# Patient Record
Sex: MALE | Race: BLACK OR AFRICAN AMERICAN | URBAN - METROPOLITAN AREA
[De-identification: names, ages, dates, MRNs, and addresses within clinical notes are randomized per-mention and may not be internally consistent; named-entity substitution may affect disease eponyms.]

---

## 2022-03-17 ENCOUNTER — APPOINTMENT (OUTPATIENT)
Dept: GENERAL RADIOLOGY | Age: 27
End: 2022-03-17
Payer: MEDICARE

## 2022-03-17 ENCOUNTER — HOSPITAL ENCOUNTER (EMERGENCY)
Age: 27
Discharge: HOME | End: 2022-03-18
Attending: EMERGENCY MEDICINE
Payer: MEDICARE

## 2022-03-17 VITALS
SYSTOLIC BLOOD PRESSURE: 154 MMHG | HEART RATE: 104 BPM | RESPIRATION RATE: 16 BRPM | OXYGEN SATURATION: 97 % | DIASTOLIC BLOOD PRESSURE: 85 MMHG | TEMPERATURE: 98.4 F

## 2022-03-17 DIAGNOSIS — G89.29 CHRONIC THUMB PAIN, BILATERAL: ICD-10-CM

## 2022-03-17 DIAGNOSIS — Z00.8 MEDICAL CLEARANCE FOR INCARCERATION: Primary | ICD-10-CM

## 2022-03-17 DIAGNOSIS — M79.644 CHRONIC THUMB PAIN, BILATERAL: ICD-10-CM

## 2022-03-17 DIAGNOSIS — M79.645 CHRONIC THUMB PAIN, BILATERAL: ICD-10-CM

## 2022-03-17 PROCEDURE — 71045 X-RAY EXAM CHEST 1 VIEW: CPT

## 2022-03-17 PROCEDURE — 73130 X-RAY EXAM OF HAND: CPT

## 2022-03-17 PROCEDURE — 74018 RADEX ABDOMEN 1 VIEW: CPT

## 2022-03-17 PROCEDURE — 99283 EMERGENCY DEPT VISIT LOW MDM: CPT

## 2022-03-17 RX ORDER — ACETAMINOPHEN 500 MG
500 TABLET ORAL EVERY 6 HOURS PRN
Qty: 20 TABLET | Refills: 0 | Status: SHIPPED | OUTPATIENT
Start: 2022-03-17 | End: 2022-03-22

## 2022-03-17 ASSESSMENT — ENCOUNTER SYMPTOMS
SORE THROAT: 0
NAUSEA: 0
COUGH: 0
CHEST TIGHTNESS: 0
CONSTIPATION: 0
VOMITING: 0
BACK PAIN: 0
SHORTNESS OF BREATH: 0
DIARRHEA: 0
ABDOMINAL PAIN: 0
RHINORRHEA: 0

## 2022-03-18 NOTE — ED PROVIDER NOTES
[address] [facility]     Emergency Department     Faculty Note/ Attestation      Pt Name: Mayuri Turcios                                       MRN: [id number]  [place] 1995  Date of evaluation: 3/17/2022    Patients PCP:    No primary care provider on file. Attestation  I performed a history and physical examination of the patient and discussed management with the resident. I reviewed the residents note and agree with the documented findings and plan of care. Any areas of disagreement are noted on the chart. I was personally present for the key portions of any procedures. I have documented in the chart those procedures where I was not present during the key portions. I have reviewed the emergency nurses triage note. I agree with the chief complaint, past medical history, past surgical history, allergies, medications, social and family history as documented unless otherwise noted below. For Physician Assistant/ Nurse Practitioner cases/documentation I have personally evaluated this patient and have completed at least one if not all key elements of the E/M (history, physical exam, and MDM). Additional findings are as noted. Initial Screens:             Vitals: There were no vitals filed for this visit. CHIEF COMPLAINT     No chief complaint on file.             DIAGNOSTIC RESULTS             RADIOLOGY:   XR ABDOMEN (KUB) (SINGLE AP VIEW)    (Results Pending)   XR CHEST PORTABLE    (Results Pending)   XR HAND RIGHT (MIN 3 VIEWS)    (Results Pending)   XR HAND LEFT (MIN 3 VIEWS)    (Results Pending)         LABS:  Labs Reviewed - No data to display      EMERGENCY DEPARTMENT COURSE:     -------------------------   ,  ,  ,        Comments    Being booked at halfway, staff saw him swallow something  Pt denies swallowing anything   only complaints of chronic thumb pain    Patient again adamantly denies taking anything, he is asking for food, chest x-ray and abdominal x-ray performed, no foreign body identified. Please state they do have something on camera where he swallowed something however we have no evidence of that and the patient seems reliable.   Will discharge patient back in the police custody    (Please note that portions of this note were completed with a voice recognition program.  Efforts were made to edit the dictations but occasionally words are mis-transcribed.)      Josef Holliday MD,, MD  Attending Emergency Physician         Josef Holliday MD  03/17/22 1104

## 2022-03-18 NOTE — ED NOTES
Pt to ED via [employer]. Per [employer] they saw pt swallow something. Pt denies. Airway intact.  Pt is a/o, ambulatory, RR even and non labored      Valeriy Louis, RN  03/17/22 0898

## 2022-03-18 NOTE — ED PROVIDER NOTES
[facility]  ED  Emergency Department Encounter  Emergency Medicine Resident     Pt Name: Rickie Rogers  MRN: [id number]  [place] 1995  Date of evaluation: 3/17/22  PCP:  No primary care provider on file. CHIEF COMPLAINT       Chief Complaint   Patient presents with    Swallowed Foreign Body       HISTORY OFPRESENT ILLNESS  (Location/Symptom, Timing/Onset, Context/Setting, Quality, Duration, Modifying Factors,Severity.)      Rickie Rogers is a 31 y.o. male with no significant past medical history who presents in police custody after reportedly swallowing a foreign body. Per  at bedside, patient was taken into another room where he was asked to change into a prisoners jumpsuit when he took something out of his waistband and swallowed it. The officer bedside was not the one who witnessed to this so was unable to say what it looked like that he swallowed. Patient adamantly denies swallowing anything. He denies swallowing any sharp objects, batteries or drugs. He does report that he was tackled to the ground afterwards by police officers but denies any significant injuries. He did not hit his head or lose consciousness. He is not taking any blood thinners. Patient does report bilateral thumb pain which he states has been going on \"for a year\". States the pain is worse in his right thumb and that he cannot put any pressure on it. He states his thumb pain is worse on the right and also worsened by being tackled by the officers. Patient does not take any medications and denies any medical history. PAST MEDICAL / SURGICAL / SOCIAL / FAMILY HISTORY     Denies medical or surgical history    Social:  Patient arrives from detention    Family Hx: No family history on file. Allergies:  Patient has no allergy information on record. Home Medications:  Prior to Admission medications    Medication Sig Start Date End Date Taking?  Authorizing Provider   acetaminophen (TYLENOL) 500 MG tablet Take 1 tablet by mouth every 6 hours as needed for Pain 3/17/22 3/22/22 Yes Army Jensen, DO       REVIEW OFSYSTEMS    (2-9 systems for level 4, 10 or more for level 5)      Review of Systems   Constitutional: Negative for chills and fever. HENT: Negative for congestion, rhinorrhea and sore throat. Respiratory: Negative for cough, chest tightness and shortness of breath. Cardiovascular: Negative for chest pain and leg swelling. Gastrointestinal: Negative for abdominal pain, constipation, diarrhea, nausea and vomiting. Genitourinary: Negative for decreased urine volume, difficulty urinating and hematuria. Musculoskeletal: Positive for arthralgias. Negative for back pain and neck pain. Skin: Negative for rash. Neurological: Negative for dizziness, numbness and headaches. All other systems reviewed and are negative. PHYSICAL EXAM   (up to 7 for level 4, 8 or more forlevel 5)      INITIAL VITALS:   Vitals:    03/17/22 2129   BP: (!) 154/85   Pulse: 117   Resp: 16   Temp: 98.4 °F (36.9 °C)   TempSrc: Oral   SpO2: 97%        Physical Exam  Vitals and nursing note reviewed. Constitutional:       General: He is not in acute distress. Appearance: He is not toxic-appearing. Comments: Adult male sitting in stretcher no acute distress. He speaks in full sentences and answers questions appropriately. HENT:      Head: Normocephalic and atraumatic. Nose: Nose normal.      Mouth/Throat:      Mouth: Mucous membranes are moist.      Pharynx: Oropharynx is clear. Comments: No foreign bodies in the mouth. Oropharynx is clear  Eyes:      Conjunctiva/sclera: Conjunctivae normal.      Pupils: Pupils are equal, round, and reactive to light. Cardiovascular:      Rate and Rhythm: Normal rate and regular rhythm. Pulses: Normal pulses. Heart sounds: No murmur heard. No friction rub. No gallop. Pulmonary:      Effort: Pulmonary effort is normal. No respiratory distress. Breath sounds: Normal breath sounds. No wheezing, rhonchi or rales. Abdominal:      General: There is no distension. Palpations: Abdomen is soft. Tenderness: There is no abdominal tenderness. Musculoskeletal:         General: Tenderness present. Cervical back: Neck supple. No rigidity. Right lower leg: No edema. Left lower leg: No edema. Comments: No skin changes. Mild tenderness to palpation over bilateral snuffbox region. Patient has full active range of motion of the bilateral thumbs. 2+ radial pulses bilaterally   Skin:     General: Skin is warm and dry. Capillary Refill: Capillary refill takes less than 2 seconds. Findings: No rash. Neurological:      General: No focal deficit present. Mental Status: He is alert. Psychiatric:         Mood and Affect: Mood normal.         Behavior: Behavior normal.         DIFFERENTIAL  DIAGNOSIS     DDX: Foreign body ingestion, drug ingestion, arthritis, overuse injury, fracture, dislocation    Initial MDM/Plan: 31 y.o. male with no significant past medical history who presents in police custody after reportedly swallowing a foreign body when asked to change into police jumpsuit. Police are unable to say what they think he swallowed. Patient adamantly denies swallowing anything. His only complaint is bilateral thumb pain which he states has been going on for at least a year but is worse today after being thrown to the ground. He denies any other injuries. His physical exam is benign. Will obtain x-rays of the chest and abdomen to evaluate for any foreign bodies. If these are negative, will ensure that the patient can tolerate p.o. prior to discharge. Will obtain x-rays of his hands. DIAGNOSTIC RESULTS / EMERGENCYDEPARTMENT COURSE / MDM     LABS:  No results found for this visit on 03/17/22.       RADIOLOGY:  XR HAND LEFT (MIN 3 VIEWS)    Result Date: 3/17/2022  EXAMINATION: THREE XRAY VIEWS OF THE LEFT HAND 3/17/2022 9:34 pm COMPARISON: None. HISTORY: ORDERING SYSTEM PROVIDED HISTORY: thumb pain TECHNOLOGIST PROVIDED HISTORY: thumb pain Reason for Exam: thumb pain FINDINGS: There is no evidence of acute fracture. There is normal alignment. No acute joint abnormality. No focal osseous lesion. No focal soft tissue abnormality. No acute osseous abnormality. XR HAND RIGHT (MIN 3 VIEWS)    Result Date: 3/17/2022  EXAMINATION: THREE XRAY VIEWS OF THE RIGHT HAND 3/17/2022 9:34 pm COMPARISON: None. HISTORY: ORDERING SYSTEM PROVIDED HISTORY: thumb pain TECHNOLOGIST PROVIDED HISTORY: thumb pain Reason for Exam: thumb pain FINDINGS: No fracture or malalignment identified. The joint spaces are maintained. No discrete soft tissue abnormality identified. No radiographic abnormality identified. XR ABDOMEN (KUB) (SINGLE AP VIEW)    Result Date: 3/17/2022  EXAMINATION: ONE SUPINE XRAY VIEW(S) OF THE ABDOMEN 3/17/2022 9:34 pm COMPARISON: None. HISTORY: ORDERING SYSTEM PROVIDED HISTORY: swallowed foreign body? TECHNOLOGIST PROVIDED HISTORY: swallowed foreign body? Reason for Exam: R/O ingestion of foreign body FINDINGS: No radiodense foreign body. Nonspecific bowel gas pattern. Osseous structures normal.     No radiodense foreign body     XR CHEST PORTABLE    Result Date: 3/17/2022  EXAMINATION: ONE XRAY VIEW OF THE CHEST 3/17/2022 9:34 pm COMPARISON: None. HISTORY: ORDERING SYSTEM PROVIDED HISTORY: swallowed foreign body? TECHNOLOGIST PROVIDED HISTORY: swallowed foreign body? Reason for Exam: R/O ingestion of foreign body FINDINGS: The cardiomediastinal silhouette is within normal limits. There is no consolidation, pneumothorax or evidence for edema. No evidence for effusion. No acute osseous abnormality is identified. No foreign body identified.          EKG  None      MEDICATIONS ORDERED:  Orders Placed This Encounter   Medications    acetaminophen (TYLENOL) 500 MG tablet     Sig: Take 1 tablet by mouth every 6 hours as needed for Pain     Dispense:  20 tablet     Refill:  0         PROCEDURES:  None      CONSULTS:  None      EMERGENCY DEPARTMENT COURSE:  ED Course as of 03/17/22 2304   Thu Mar 17, 2022   2215 Work-up unremarkable. Patient was able to tolerate p.o. He denies any needs. Will be discharged at this time with prescription for Tylenol. Patient was instructed to rest and avoid playing video games if possible. Extensor stretches were shown to patient. [KA]      ED Course User Index  [KA] Ayaka Henning DO          FINAL IMPRESSION      1. Medical clearance for incarceration    2.  Chronic thumb pain, bilateral          DISPOSITION / PLAN     DISPOSITION Decision To Discharge 03/17/2022 10:15:38 PM      PATIENT REFERRED TO:  [address] [place] [address] Primary Care  [place] [address]  [phone]  Schedule an appointment as soon as possible for a visit       [facility] ED  [address] [place] [address]  [phone]    If symptoms worsen      DISCHARGE MEDICATIONS:  New Prescriptions    ACETAMINOPHEN (TYLENOL) 500 MG TABLET    Take 1 tablet by mouth every 6 hours as needed for Pain       Ayaka Henning DO  Emergency Medicine Resident    (Please note that portions of this note were completed with a voice recognition program.Efforts were made to edit the dictations but occasionally words are mis-transcribed.)        Ayaka Henning DO  Resident  03/17/22 4648